# Patient Record
Sex: MALE | Race: ASIAN | NOT HISPANIC OR LATINO | Employment: FULL TIME | URBAN - METROPOLITAN AREA
[De-identification: names, ages, dates, MRNs, and addresses within clinical notes are randomized per-mention and may not be internally consistent; named-entity substitution may affect disease eponyms.]

---

## 2019-09-06 ENCOUNTER — OFFICE VISIT (OUTPATIENT)
Dept: URGENT CARE | Facility: URGENT CARE | Age: 29
End: 2019-09-06
Payer: COMMERCIAL

## 2019-09-06 ENCOUNTER — ANCILLARY PROCEDURE (OUTPATIENT)
Dept: GENERAL RADIOLOGY | Facility: CLINIC | Age: 29
End: 2019-09-06
Attending: PHYSICIAN ASSISTANT
Payer: COMMERCIAL

## 2019-09-06 VITALS
HEART RATE: 64 BPM | SYSTOLIC BLOOD PRESSURE: 134 MMHG | TEMPERATURE: 98 F | DIASTOLIC BLOOD PRESSURE: 81 MMHG | BODY MASS INDEX: 23.01 KG/M2 | WEIGHT: 143.2 LBS | RESPIRATION RATE: 18 BRPM | HEIGHT: 66 IN | OXYGEN SATURATION: 97 %

## 2019-09-06 DIAGNOSIS — M25.531 RIGHT WRIST PAIN: Primary | ICD-10-CM

## 2019-09-06 DIAGNOSIS — S69.91XA WRIST INJURY, RIGHT, INITIAL ENCOUNTER: ICD-10-CM

## 2019-09-06 DIAGNOSIS — M25.531 RIGHT WRIST PAIN: ICD-10-CM

## 2019-09-06 PROCEDURE — 99203 OFFICE O/P NEW LOW 30 MIN: CPT | Performed by: PHYSICIAN ASSISTANT

## 2019-09-06 PROCEDURE — 73110 X-RAY EXAM OF WRIST: CPT | Mod: RT

## 2019-09-06 RX ORDER — IBUPROFEN 800 MG/1
800 TABLET, FILM COATED ORAL EVERY 8 HOURS PRN
Qty: 30 TABLET | Refills: 0 | Status: SHIPPED | OUTPATIENT
Start: 2019-09-06 | End: 2019-09-16

## 2019-09-06 SDOH — HEALTH STABILITY: MENTAL HEALTH: HOW OFTEN DO YOU HAVE A DRINK CONTAINING ALCOHOL?: NEVER

## 2019-09-06 ASSESSMENT — MIFFLIN-ST. JEOR: SCORE: 1565.17

## 2019-09-06 NOTE — PROGRESS NOTES
"S: 28-year-old male with right wrist pain for 2 months after he was drinking, became angry and punched a wall.  Has persistent pain on the ulnar dorsal wrist.  No numbness or tingling.  No fever.  No rash.  He does lots of lifting at work. Ice no help.  No prior wrist injury.  No Known Allergies    Past medical history-negative diabetes  Family medical history-negative diabetes.      No current outpatient medications on file prior to visit.  No current facility-administered medications on file prior to visit.     Social History     Tobacco Use     Smoking status: Never Smoker     Smokeless tobacco: Never Used   Substance Use Topics     Alcohol use: Not Currently     Frequency: Never       ROS:  CONSTITUTIONAL: Negative for fatigue or fever.  EYES: Negative for eye problems.  ENT: Negative   RESP: Negative  CV: Negative for chest pains.  GI: Negative for vomiting.  MUSCULOSKELETAL: As above   NEUROLOGIC: Negative for headaches.  SKIN: Negative for rash.  PSYCH: Normal mentation for age.    OBJECTIVE:  /81 (BP Location: Left arm, Patient Position: Sitting, Cuff Size: Adult Regular)   Pulse 64   Temp 98  F (36.7  C) (Oral)   Resp 18   Ht 1.665 m (5' 5.55\")   Wt 65 kg (143 lb 3.2 oz)   SpO2 97%   BMI 23.43 kg/m    GENERAL APPEARANCE: Healthy, alert and no distress.  EYES:Conjunctiva/sclera clear.  THROAT: No erythema w/o tonsillar enlargement . No exudates.  NECK: Supple, nontender, no lymphadenopathy.  RESP: Breathing comfortably   CV: Regular rate and rhythm, normal S1 S2, no murmur noted.  NEURO: Awake, alert    SKIN: No rashes  Right wrist is with increased bony prominence ulnar styloid compared to the other wrist joint.  He is tender over ulnar styloid process with ulnar deviation of the wrist.  Circulation and sensation intact.  Study Result     Examination:  XR WRIST RT G/E 3 VW     Date:  9/6/2019 3:11 PM      Clinical Information: dorsal ulnar wrist pain after punching a wall 2  months ago; Right " wrist pain; Wrist injury, right, initial encounter      Comparison: None.     Findings: No fracture or subluxation. Normal joint spacing and  alignment. Normal bone mineralization. No soft tissue abnormality is  evident.                                                                      Impression: Unremarkable right wrist examination.         ASSESSMENT:     ICD-10-CM    1. Right wrist pain M25.531 XR Wrist Right G/E 3 Views     order for DME     ORTHO  REFERRAL     ibuprofen (ADVIL/MOTRIN) 800 MG tablet   2. Wrist injury, right, initial encounter S69.91XA XR Wrist Right G/E 3 Views     order for DME     ORTHO  REFERRAL     ibuprofen (ADVIL/MOTRIN) 800 MG tablet           PLAN:Ice. Ibu. Wrist splint x 1 week. See Ortho  I have discussed clinical findings with patient.  Side effects of medications discussed.  Symptomatic care is discussed.  I have discussed the possibility of  worsening symptoms and to RTC or ER if they occur.  All questions are answered and patient is in agreement with plan.   Patient care instructions are given to at the end of visit.   Lots of rest and fluids.    Marcy Shaffer PA-C

## 2019-09-19 ENCOUNTER — OFFICE VISIT (OUTPATIENT)
Dept: ORTHOPEDICS | Facility: CLINIC | Age: 29
End: 2019-09-19
Payer: COMMERCIAL

## 2019-09-19 VITALS
WEIGHT: 143 LBS | HEIGHT: 66 IN | DIASTOLIC BLOOD PRESSURE: 77 MMHG | SYSTOLIC BLOOD PRESSURE: 118 MMHG | BODY MASS INDEX: 22.98 KG/M2

## 2019-09-19 DIAGNOSIS — S69.91XA INJURY OF RIGHT WRIST, INITIAL ENCOUNTER: Primary | ICD-10-CM

## 2019-09-19 PROCEDURE — 99203 OFFICE O/P NEW LOW 30 MIN: CPT | Performed by: PEDIATRICS

## 2019-09-19 ASSESSMENT — MIFFLIN-ST. JEOR: SCORE: 1564.25

## 2019-09-19 NOTE — LETTER
"    9/19/2019         RE: Edy Calderon  [address] [facility] [address]        Dear Colleague,    Thank you for referring your patient, Edy Calderon, to the [facility]. Please see a copy of my visit note below.    Sports Medicine Clinic Visit    PCP: No Ref-Primary, Physician    Edy Calderon is a 28 year old male who is seen  in consultation at the request of  Marcy Shaffer PA-C presenting with right wrist pain    Injury: He reports right wrist pain after punching a walling in July 2019. He reports pain in the ulnar wrist. He reports his pain increases with extension and lifting. He was provided a wrist brace from his PCP which has helped over the past 1.5 weeks. He is right hand dominate.    Location of Pain: right wrist  Duration of Pain: 2 month(s)  Rating of Pain at worst: 6/10  Rating of Pain Currently: 3/10  Symptoms are better with: bracing  Symptoms are worse with: extension and lifting  Additional Features:   Positive: swelling and weakness   Negative: bruising, popping, grinding, catching, locking, instability, paresthesias and numbness  Other evaluation and/or treatments so far consists of: Nothing  Prior History of related problems: nothing    Social History: works at [employer]     Review of Systems  Skin: no bruising, no swelling  Musculoskeletal: as above  Neurologic: no numbness, paresthesias  Remainder of review of systems is negative including constitutional, CV, pulmonary, GI, except as noted in HPI or medical history.    Patient's current problem list, past medical and surgical history, and family history were reviewed.    There is no problem list on file for this patient.    No past medical history on file.  No past surgical history on file.  No family history on file.      Objective  /77   Ht 1.665 m (5' 5.55\")   Wt 64.9 kg (143 lb)   BMI 23.40 kg/m       GENERAL APPEARANCE: healthy, alert and no distress   GAIT: NORMAL  SKIN: no " suspicious lesions or rashes  HEENT: Sclera clear, anicteric  CV: good peripheral pulses  RESP: Breathing not labored  NEURO: Normal strength and tone, mentation intact and speech normal  PSYCH:  mentation appears normal and affect normal/bright    Bilateral Wrist and Hand exam  Inspection:       No swelling, bruising or deformity right, slightly more prominent ulnar styloid    Tender:       TFCC right and into extensor tendons    Non Tender:       Remainder of the Wrist and Hand bilateral    ROM:       Full and symmetric active and passive range of motion of the forearm, wrist and digits bilateral    Strength:       5/5 strength in the muscles of the hand, wrist and forearm bilateral    Special Tests:        positive (+) TFCC compression test right    Neurovascular:       2+ radial pulses bilaterally with brisk capillary refill and      normal sensation to light touch in the radial, median and ulnar nerve distributions      Radiology  I visualized and reviewed these images with the patient  Xr Wrist Right G/e 3 Views  Result Date: 9/6/2019  Examination:  XR WRIST RT G/E 3 VW Date:  9/6/2019 3:11 PM Clinical Information: dorsal ulnar wrist pain after punching a wall 2 months ago; Right wrist pain; Wrist injury, right, initial encounter Comparison: None. Findings: No fracture or subluxation. Normal joint spacing and alignment. Normal bone mineralization. No soft tissue abnormality is evident.   Impression: Unremarkable right wrist examination. TENZIN ROME MD    Assessment:  1. Injury of right wrist, initial encounter      We discussed these other possible diagnosis: TFCC injury  Exam suspicious for TFCC injury. We discussed the following treatment options: symptom treatment, activity modification/rest, imaging, rehab and referral. Following discussion, plan: will continue bracing and consider imaging pending clinical course.    Plan:  - Today's Plan of Care:  Continue Bracing    -We also discussed other future  treatment options:  MR arthrogram of the right wrist, Occupational Therapy    Follow Up: 3-4 weeks    Concerning signs and symptoms were reviewed.  The patient expressed understanding of this management plan and all questions were answered at this time.    Thanks for the opportunity to participate in the care of this patient, I will keep you updated on their progress.    CC: Marcy Cuevas MD [facility]  Primary Care Sports Medicine  [facility]    Again, thank you for allowing me to participate in the care of your patient.        Sincerely,        Anita Cuevas MD

## 2019-09-19 NOTE — PATIENT INSTRUCTIONS
We discussed these other possible diagnosis: CC injury    Plan:  - Today's Plan of Care:  Continue Bracing    -We also discussed other future treatment options:  MR arthrogram of the right wrist, Occupational Therapy    Follow Up: 3-4 weeks    If you have any further questions for your physician or physician s care team you can call [phone] and use option 3 to leave a voice message. Calls received during business hours will be returned same day.

## 2019-09-19 NOTE — PROGRESS NOTES
"Sports Medicine Clinic Visit    PCP: No Ref-Primary, Physician    Edy Calderon is a 28 year old male who is seen  in consultation at the request of  Marcy Shaffer PA-C presenting with right wrist pain    Injury: He reports right wrist pain after punching a walling in July 2019. He reports pain in the ulnar wrist. He reports his pain increases with extension and lifting. He was provided a wrist brace from his PCP which has helped over the past 1.5 weeks. He is right hand dominate.    Location of Pain: right wrist  Duration of Pain: 2 month(s)  Rating of Pain at worst: 6/10  Rating of Pain Currently: 3/10  Symptoms are better with: bracing  Symptoms are worse with: extension and lifting  Additional Features:   Positive: swelling and weakness   Negative: bruising, popping, grinding, catching, locking, instability, paresthesias and numbness  Other evaluation and/or treatments so far consists of: Nothing  Prior History of related problems: nothing    Social History: works at [employer]     Review of Systems  Skin: no bruising, no swelling  Musculoskeletal: as above  Neurologic: no numbness, paresthesias  Remainder of review of systems is negative including constitutional, CV, pulmonary, GI, except as noted in HPI or medical history.    Patient's current problem list, past medical and surgical history, and family history were reviewed.    There is no problem list on file for this patient.    No past medical history on file.  No past surgical history on file.  No family history on file.      Objective  /77   Ht 1.665 m (5' 5.55\")   Wt 64.9 kg (143 lb)   BMI 23.40 kg/m      GENERAL APPEARANCE: healthy, alert and no distress   GAIT: NORMAL  SKIN: no suspicious lesions or rashes  HEENT: Sclera clear, anicteric  CV: good peripheral pulses  RESP: Breathing not labored  NEURO: Normal strength and tone, mentation intact and speech normal  PSYCH:  mentation appears normal and affect normal/bright    Bilateral " Wrist and Hand exam  Inspection:       No swelling, bruising or deformity right, slightly more prominent ulnar styloid    Tender:       TFCC right and into extensor tendons    Non Tender:       Remainder of the Wrist and Hand bilateral    ROM:       Full and symmetric active and passive range of motion of the forearm, wrist and digits bilateral    Strength:       5/5 strength in the muscles of the hand, wrist and forearm bilateral    Special Tests:        positive (+) TFCC compression test right    Neurovascular:       2+ radial pulses bilaterally with brisk capillary refill and      normal sensation to light touch in the radial, median and ulnar nerve distributions      Radiology  I visualized and reviewed these images with the patient  Xr Wrist Right G/e 3 Views  Result Date: 9/6/2019  Examination:  XR WRIST RT G/E 3 VW Date:  9/6/2019 3:11 PM Clinical Information: dorsal ulnar wrist pain after punching a wall 2 months ago; Right wrist pain; Wrist injury, right, initial encounter Comparison: None. Findings: No fracture or subluxation. Normal joint spacing and alignment. Normal bone mineralization. No soft tissue abnormality is evident.   Impression: Unremarkable right wrist examination. TENZIN ROME MD    Assessment:  1. Injury of right wrist, initial encounter      We discussed these other possible diagnosis: TFCC injury  Exam suspicious for TFCC injury. We discussed the following treatment options: symptom treatment, activity modification/rest, imaging, rehab and referral. Following discussion, plan: will continue bracing and consider imaging pending clinical course.    Plan:  - Today's Plan of Care:  Continue Bracing    -We also discussed other future treatment options:  MR arthrogram of the right wrist, Occupational Therapy    Follow Up: 3-4 weeks    Concerning signs and symptoms were reviewed.  The patient expressed understanding of this management plan and all questions were answered at this  time.    Thanks for the opportunity to participate in the care of this patient, I will keep you updated on their progress.    CC: Marcy Cuevas MD CAQ  Primary Care Sports Medicine  [facility]

## 2019-10-31 ENCOUNTER — OFFICE VISIT (OUTPATIENT)
Dept: FAMILY MEDICINE | Facility: CLINIC | Age: 29
End: 2019-10-31
Payer: COMMERCIAL

## 2019-10-31 VITALS
HEIGHT: 66 IN | SYSTOLIC BLOOD PRESSURE: 112 MMHG | DIASTOLIC BLOOD PRESSURE: 73 MMHG | HEART RATE: 55 BPM | RESPIRATION RATE: 16 BRPM | TEMPERATURE: 98.1 F | WEIGHT: 145.5 LBS | OXYGEN SATURATION: 96 % | BODY MASS INDEX: 23.38 KG/M2

## 2019-10-31 DIAGNOSIS — M71.9 DISORDER OF BURSAE AND TENDONS IN SHOULDER REGION: Primary | ICD-10-CM

## 2019-10-31 DIAGNOSIS — M67.919 DISORDER OF BURSAE AND TENDONS IN SHOULDER REGION: Primary | ICD-10-CM

## 2019-10-31 PROCEDURE — 99213 OFFICE O/P EST LOW 20 MIN: CPT | Performed by: PHYSICIAN ASSISTANT

## 2019-10-31 RX ORDER — METHOCARBAMOL 750 MG/1
750 TABLET, FILM COATED ORAL 3 TIMES DAILY PRN
Qty: 45 TABLET | Refills: 1 | Status: SHIPPED | OUTPATIENT
Start: 2019-10-31

## 2019-10-31 RX ORDER — DICLOFENAC SODIUM 75 MG/1
75 TABLET, DELAYED RELEASE ORAL 2 TIMES DAILY
Qty: 30 TABLET | Refills: 1 | Status: SHIPPED | OUTPATIENT
Start: 2019-10-31

## 2019-10-31 ASSESSMENT — PAIN SCALES - GENERAL: PAINLEVEL: SEVERE PAIN (7)

## 2019-10-31 ASSESSMENT — MIFFLIN-ST. JEOR: SCORE: 1574.79

## 2019-10-31 NOTE — PROGRESS NOTES
"Subjective     Edy Calderon is a 28 year old male who presents to clinic today for the following health issues:    HPI   Joint Pain    Onset: 1.5 months    Description:   Location: left shoulder  Character: Sharp    Intensity: severe    Progression of Symptoms: same    Accompanying Signs & Symptoms:  Other symptoms: none    History:   Previous similar pain: no       Precipitating factors:   Trauma or overuse: no . Patient denies any trauma, any activity that led to pain. He woke up one day with shoulder pain     Alleviating factors:  Improved by: nothing    Therapies Tried and outcome: Patient states he hasn't tried anything          There is no problem list on file for this patient.    History reviewed. No pertinent surgical history.    Social History     Tobacco Use     Smoking status: Never Smoker     Smokeless tobacco: Never Used   Substance Use Topics     Alcohol use: Not Currently     Frequency: Never     History reviewed. No pertinent family history.      Current Outpatient Medications   Medication Sig Dispense Refill     diclofenac (VOLTAREN) 75 MG EC tablet Take 1 tablet (75 mg) by mouth 2 times daily 30 tablet 1     methocarbamol (ROBAXIN) 750 MG tablet Take 1 tablet (750 mg) by mouth 3 times daily as needed for muscle spasms 45 tablet 1     No Known Allergies    Reviewed and updated as needed this visit by Provider  Tobacco  Allergies  Meds  Problems  Med Hx  Surg Hx  Fam Hx         Review of Systems   ROS COMP: Constitutional, HEENT, cardiovascular, pulmonary, gi and gu systems are negative, except as otherwise noted.      Objective    /73 (BP Location: Right arm, Patient Position: Sitting, Cuff Size: Adult Large)   Pulse 55   Temp 98.1  F (36.7  C) (Oral)   Resp 16   Ht 1.664 m (5' 5.5\")   Wt 66 kg (145 lb 8 oz)   SpO2 96%   BMI 23.84 kg/m    Body mass index is 23.84 kg/m .  Physical Exam   GENERAL: healthy, alert and no distress  MS: LUE exam shows normal strength and muscle mass, no " deformities, no erythema, induration, or nodules, no evidence of joint effusion, ROM of all joints is normal, no evidence of joint instability and tenderness over the lateral shoulder . Painful lateral abduction    Diagnostic Test Results:  Labs reviewed in Epic  none         Assessment & Plan       ICD-10-CM    1. Disorder of bursae and tendons in shoulder region M71.9 diclofenac (VOLTAREN) 75 MG EC tablet    M67.919 methocarbamol (ROBAXIN) 750 MG tablet     Diclofenac 75 mg twice a day for 7 with food, then use on as needed basis  Robaxin 750 mg three times a day as needed for muscle tightness   Discussed that may need steroid injection in the future.   Follow up in 2 weeks if not better       Return in about 2 weeks (around 11/14/2019), or if symptoms worsen or fail to improve.    Cara Wing PA-C  [facility]

## 2019-10-31 NOTE — PATIENT INSTRUCTIONS
Diclofenac 75 mg twice a day for 7 with food, then use on as needed basis  Robaxin 750 mg three times a day as needed for muscle tightness     Follow up in 2 weeks if not better   Patient Education     Understanding Rotator Cuff Tendonitis    Tendons are tough tissues that connect muscles to bone. A group of 4 muscles and their tendons form a  cuff  around the head of the upper arm bone. This is called the rotator cuff. It connects the upper arm to the shoulder blade. It gives the shoulder joint stability and strength.  If tendons are injured or strained, they may become irritated and swollen (inflamed). This is called tendonitis. Rotator cuff tendonitis may cause shoulder pain and loss of function.  What causes rotator cuff tendonitis?  Tendonitis results when the rotator cuff tendons are injured or overworked. The most common cause of injury is repetitive overhead activities. These can be work-related activities such as reaching, pushing, or lifting. Or they can be sports-related activities such as throwing, swimming, or lifting weights.  Symptoms of rotator cuff tendonitis  Pain on the side of the upper arm is the most common symptom. Pain may get worse with overhead movements or when you raise the arm above shoulder level. It may also hurt to lie on the shoulder at night.  Treatment for rotator cuff tendonitis  Treatment may include the following:    Active rest. This lets the rotator cuff heal. Active rest means using your arm and shoulder, but avoiding activities that cause pain, such as reaching overhead or sleeping on the shoulder.    Cold packs. Putting ice packs on the shoulder helps reduce swelling and relieve pain.    Pain medicines. Prescription or over-the-counter pain medicines can help relieve pain and swelling.    Arm and shoulder exercises. These help keep the shoulder joint mobile as it heals. They also help improve the strength of muscles around the joint.  Possible complications  It might be  tempting to stop using your shoulder completely to avoid pain. But doing so may lead to a condition called  frozen shoulder.  To help prevent this, following instructions you are given for active rest and for doing exercises to help your shoulder heal.  When to call your healthcare provider  Call your healthcare provider right away if you have any of these:    Fever of 100.4 F (38 C) or higher, or as directed    Symptoms that don t get better, or get worse    New symptoms   Date Last Reviewed: 3/10/2016    3934-8122 [employer]. [address] [place] [address]. All rights reserved. This information is not intended as a substitute for professional medical care. Always follow your healthcare professional's instructions.           Patient Education     Shoulder External Rotation, Isometric (Strength)    1. Bend your right arm in front of your body, palm up. Hold your right wrist with your left hand.  2. Try to push your right arm outward, while pulling back with your left arm. Try not to let either arm move. Push and pull both arms firmly in opposite directions.  3. Hold for 5 seconds. Then relax.  4. Repeat 5 times.  5. Repeat this exercise 3 times a day, or as instructed.  Date Last Reviewed: 3/10/2016    4115-8496 [employer]. [address] [place] [address]. All rights reserved. This information is not intended as a substitute for professional medical care. Always follow your healthcare professional's instructions.           Patient Education     Shoulder Abduction (Flexibility)    1. Stand up straight. Or lie on your back on the floor with legs straight. Hold a broomstick horizontally. Cup the top of the broomstick with your right hand. Hold the broomstick just above the broom with your left hand, palm facing down.  2. Push the broomstick to the right with your left hand, raising your right arm up. Raise it only as high as feels comfortable.  3. Hold for a few  seconds. Return to the starting position.  4. Repeat 3 to 5 times, or as instructed. Build up to holding each stretch for 10 to 30 seconds.     Tip: If you don t have a broom, you can also do this exercise with a cane, mop, or yardstick.      Date Last Reviewed: 3/10/2016    4851-2440 [employer]. [address] [place] [address]. All rights reserved. This information is not intended as a substitute for professional medical care. Always follow your healthcare professional's instructions.           Patient Education     Shoulder Flexion (Flexibility)    1. Sit in a chair sideways next to a table. Lay your right arm on the table, pointed forward.  2. Slowly lean forward.  Slide your arm forward on the table. Feel the stretch in your right shoulder.  3. Hold for 5 seconds. Slowly sit back up.  4. Repeat 5 times.  5. Repeat this exercise 3 times a day, or as instructed.  Date Last Reviewed: 3/10/2016    3461-7765 [employer]. [address] [place] [address]. All rights reserved. This information is not intended as a substitute for professional medical care. Always follow your healthcare professional's instructions.

## 2023-03-20 ENCOUNTER — ANCILLARY PROCEDURE (OUTPATIENT)
Dept: GENERAL RADIOLOGY | Facility: CLINIC | Age: 33
End: 2023-03-20
Payer: COMMERCIAL

## 2023-03-20 ENCOUNTER — OFFICE VISIT (OUTPATIENT)
Dept: URGENT CARE | Facility: URGENT CARE | Age: 33
End: 2023-03-20
Payer: COMMERCIAL

## 2023-03-20 VITALS
TEMPERATURE: 97.4 F | HEART RATE: 65 BPM | OXYGEN SATURATION: 97 % | BODY MASS INDEX: 24.84 KG/M2 | DIASTOLIC BLOOD PRESSURE: 89 MMHG | WEIGHT: 151.6 LBS | SYSTOLIC BLOOD PRESSURE: 144 MMHG

## 2023-03-20 DIAGNOSIS — M79.644 PAIN OF FINGER OF RIGHT HAND: ICD-10-CM

## 2023-03-20 DIAGNOSIS — S62.639A CLOSED AVULSION FRACTURE OF DISTAL PHALANX OF FINGER, INITIAL ENCOUNTER: Primary | ICD-10-CM

## 2023-03-20 PROCEDURE — 73140 X-RAY EXAM OF FINGER(S): CPT | Mod: TC | Performed by: RADIOLOGY

## 2023-03-20 PROCEDURE — 99203 OFFICE O/P NEW LOW 30 MIN: CPT

## 2023-03-20 ASSESSMENT — PAIN SCALES - GENERAL: PAINLEVEL: SEVERE PAIN (7)

## 2023-03-20 NOTE — PROGRESS NOTES
ASSESSMENT:  (I47.159D) Closed avulsion fracture of distal phalanx of finger, initial encounter  (primary encounter diagnosis)  Plan: Orthopedic  Referral, FOAM FINGER         SPLINT S    (M54.136) Pain of finger of right hand  Plan: XR Finger Right G/E 2 Views    PLAN:  Informed the patient that the right index finger x-ray shows an avulsion fracture per the radiologist report.  We discussed the need for him to wear the splint until follow-up with orthopedics.  Informed him he can use Tylenol and or ibuprofen as needed for pain with maximum dose Tylenol being 4000 mg in a 24-hour period of time and to take ibuprofen with food to avoid upset stomach.  Work note provided.  Discussed the need to return to clinic with any new or worsening symptoms.  Patient acknowledged his understanding of the above plan.    The use of Dragon/[employer] services may have been used to construct the content in this note; any grammatical or spelling errors are non-intentional. Please contact the author of this note directly if you are in need of any clarification.      YOKO Linares CNP      SUBJECTIVE:  Edy Calderon is a 32 year old male who sustained a right index finger injury 2 weeks ago.   Mechanism of injury: playing basketball and the ball hit the finger.   Immediate symptoms: immediate pain, immediate swelling.   Symptoms have been unchanged since that time.   Patient has not used any treatment.   Prior history of related problems: no prior problems with this area in the past.    ROS:  Negative except noted above.       OBJECTIVE:  Blood pressure (!) 144/89, pulse 65, temperature 97.4  F (36.3  C), temperature source Tympanic, weight 68.8 kg (151 lb 9.6 oz), SpO2 97 %.  Appearance: in no apparent distress.  Inspection: Edema noted at the distal phalanx of the right index finger  tender over DIP joint  Limited ROM DIP joint due to pain and swelling    X-ray: mildly displaced avulsion fracture at the  dorsal base of the second distal phalanx with intra-articular extension per radiologist report

## 2023-03-20 NOTE — PATIENT INSTRUCTIONS
Finger x-ray shows an avulsion fracture per the radiologist report.  Wear the splint until follow up with orthopedics.  Can use Tylenol and/or ibuprofen as needed for pain.  Maximum dose of Tylenol is 4000mg in a 24 hour period of time.  Take ibuprofen with food to avoid stomach upset.

## 2023-03-20 NOTE — LETTER
March 20, 2023      Edy Calderon  [address] [place] [address]        To Whom It May Concern:    Edy Calderon  was seen on March 20, 2023.  Please excuse him from any job duties that would require him to use his right index finger until follow up with orthopedics due to injury.        Sincerely,        YOKO Linares CNP

## 2023-03-23 ENCOUNTER — TELEPHONE (OUTPATIENT)
Dept: ORTHOPEDICS | Facility: CLINIC | Age: 33
End: 2023-03-23
Payer: COMMERCIAL

## 2023-03-23 NOTE — TELEPHONE ENCOUNTER
Orthopedic/Sports Medicine Fracture Triage    Incoming call/or message from call center member.    Fracture type: Finger    The patient is in a  nothing.    Date of injury 3/6/23.    Triaged by: Dr. Penny.    Determined to be managed Non operatively.    Needs to be seen ASAP. Needs to get splinted.     Additional Comments/information: Staff message sent to ortho coordinators and sports med staff to schedule patient with sports medicine.    Ginny Lowe, ATC

## 2023-03-23 NOTE — TELEPHONE ENCOUNTER
[facility] Call Center    Phone Message    May a detailed message be left on voicemail: yes     Reason for Call: Other: Please see Urgent ref for Closed avulsion fracture of distal phalanx of finger, . Surg or non? Appt options pt lives in [place] however nothing with surgeon at that location until next week      Action Taken: Other: ortho     Travel Screening: Not Applicable

## 2023-03-24 NOTE — TELEPHONE ENCOUNTER
DIAGNOSIS: Splint needed// per staff message   APPOINTMENT DATE: 3.25.23 - finger R   NOTES STATUS DETAILS   OFFICE NOTE from referring provider Internal 3.20.23 Buster Domingo, APRN CNP     OFFICE NOTE from other specialist Internal Internal:  9.19.19 Anita Cuevas MD    9.6.19 Marcy Shaffer PA-C     HP:  9.15.15 Erick Bernal, DO       MEDICATION LIST Internal    XRAYS (IMAGES & REPORTS) Internal 3.20.23 R finger  9.6.19 R wrist

## 2023-03-25 ENCOUNTER — PRE VISIT (OUTPATIENT)
Dept: ORTHOPEDICS | Facility: CLINIC | Age: 33
End: 2023-03-25

## 2023-03-25 ENCOUNTER — OFFICE VISIT (OUTPATIENT)
Dept: ORTHOPEDICS | Facility: CLINIC | Age: 33
End: 2023-03-25
Payer: COMMERCIAL

## 2023-03-25 VITALS — BODY MASS INDEX: 24.11 KG/M2 | HEIGHT: 66 IN | WEIGHT: 150 LBS

## 2023-03-25 DIAGNOSIS — M20.011 MALLET FINGER OF RIGHT FINGER(S): ICD-10-CM

## 2023-03-25 DIAGNOSIS — S62.639A CLOSED AVULSION FRACTURE OF DISTAL PHALANX OF FINGER, INITIAL ENCOUNTER: Primary | ICD-10-CM

## 2023-03-25 PROCEDURE — 99203 OFFICE O/P NEW LOW 30 MIN: CPT | Performed by: FAMILY MEDICINE

## 2023-03-25 NOTE — LETTER
Date:March 27, 2023      Patient was self referred, no letter generated. Do not send.        [facility] Health Information

## 2023-03-25 NOTE — LETTER
"  3/25/2023      RE: Edy Calderon  [address]  [facility] [address]     Dear Colleague,    Thank you for referring your patient, Edy Calderon, to the [facility] [place]. Please see a copy of my visit note below.    Sports Medicine Clinic Visit    PCP: No Ref-Primary, Physician    Edy Calderon is a 32 year old male who is seen as an UC follow up presenting with right index finger pain.    Injury: He was playing basketball and the ball hit his finger.    Location of Pain: right index finger  Duration of Pain: 2 weeks  Rating of Pain: 7/10  Pain is better with: Splint, ice  Pain is worse with: Movement  Additional Features: Swelling which has improved  Treatment so far consists of: Ice, splint  Prior History of related problems: None    Ht 1.676 m (5' 6\")   Wt 68 kg (150 lb)   BMI 24.21 kg/m       Second distal phalanx avulsion fracture.  Patient was playing basketball two weeks ago and was struck at the distal digit.  Seen in urgent care 3/20/2023, 5 days ago,with a mallet finger and avulsion fracture and given a finger splint.  Skin intact.    Patient has worked at an auto parts store, [employer], stocking.        EXAM: XR FINGER RIGHT G/E 2 VIEWS  LOCATION: [facility] [place]  DATE/TIME: 3/20/2023 5:58 PM     INDICATION:  Pain of finger of right hand  COMPARISON: None.                                                                      IMPRESSION: Acute mildly displaced avulsion fracture at the dorsal base of the second distal phalanx with intra-articular extension. Surrounding soft tissue swelling. There is normal joint spacing and alignment.        PMH:  Otitis    Active problem list:  There is no problem list on file for this patient.      FH:  No family history on file.    SH:  Social History     Socioeconomic History     Marital status: Single     Spouse name: Not on file     Number of children: Not on file     Years of education: Not on file     Highest " education level: Not on file   Occupational History     Not on file   Tobacco Use     Smoking status: Never     Smokeless tobacco: Never   Substance and Sexual Activity     Alcohol use: Not Currently     Drug use: Never     Sexual activity: Not on file   Other Topics Concern     Not on file   Social History Narrative     Not on file     Social Determinants of Health     Financial Resource Strain: Not on file   Food Insecurity: Not on file   Transportation Needs: Not on file   Physical Activity: Not on file   Stress: Not on file   Social Connections: Not on file   Intimate Partner Violence: Not on file   Housing Stability: Not on file       MEDS:  See EMR, reviewed  ALL:  See EMR, reviewed    REVIEW OF SYSTEMS:  CONSTITUTIONAL:NEGATIVE for fever, chills, change in weight  INTEGUMENTARY/SKIN: NEGATIVE for worrisome rashes, moles or lesions  EYES: NEGATIVE for vision changes or irritation  ENT/MOUTH: NEGATIVE for ear, mouth and throat problems  RESP:NEGATIVE for significant cough or SOB  BREAST: NEGATIVE for masses, tenderness or discharge  CV: NEGATIVE for chest pain, palpitations or peripheral edema  GI: NEGATIVE for nausea, abdominal pain, heartburn, or change in bowel habits  :NEGATIVE for frequency, dysuria, or hematuria  :NEGATIVE for frequency, dysuria, or hematuria  NEURO: NEGATIVE for weakness, dizziness or paresthesias  ENDOCRINE: NEGATIVE for temperature intolerance, skin/hair changes  HEME/ALLERGY/IMMUNE: NEGATIVE for bleeding problems  PSYCHIATRIC: NEGATIVE for changes in mood or affect      Objective: His injury was 2 weeks ago; he has been splinted with an aluminum splint for 5 days.  Skin is intact over the distal digit of the index finger.  He cannot extend the distal digit against resistance with adequate strength.  His distal digit is kept in a neutral position during the exam, taking care to avoid any finger flexion.    I personally reviewed with the patient the x-ray that shows the avulsion  fracture    Assessment mallet finger deformity with avulsion fracture of index finger x2 weeks, splinted x5 days    Plan: I told the patient that I would have the x-rays reviewed by hand surgery when they are here in clinic early this week and call him at [phone], to make sure that he does not need to have the fracture pinned.  If he proceeds with a nonsurgical approach of the fracture, he understands that when this heals he may still have a slight flexion deformity.  He will see hand therapy tomorrow for formation of 2 custom mallet finger splints that he will wear for the next 5 weeks.  He understands that when he changes the splint he will have to keep his finger in extension by either pinching it with his thumb or keeping it flat on the table.  Follow-up with me in 5 weeks.                                    Again, thank you for allowing me to participate in the care of your patient.      Sincerely,    Jayjay Davalos MD

## 2023-03-25 NOTE — PROGRESS NOTES
"Sports Medicine Clinic Visit    PCP: No Ref-Primary, Physician    Edy Calderon is a 32 year old male who is seen as an UC follow up presenting with right index finger pain.    Injury: He was playing basketball and the ball hit his finger.    Location of Pain: right index finger  Duration of Pain: 2 weeks  Rating of Pain: 7/10  Pain is better with: Splint, ice  Pain is worse with: Movement  Additional Features: Swelling which has improved  Treatment so far consists of: Ice, splint  Prior History of related problems: None    Ht 1.676 m (5' 6\")   Wt 68 kg (150 lb)   BMI 24.21 kg/m       Second distal phalanx avulsion fracture.  Patient was playing basketball two weeks ago and was struck at the distal digit.  Seen in urgent care 3/20/2023, 5 days ago,with a mallet finger and avulsion fracture and given a finger splint.  Skin intact.    Patient has worked at an auto parts store, [employer], stocking.        EXAM: XR FINGER RIGHT G/E 2 VIEWS  LOCATION: [facility] [place]  DATE/TIME: 3/20/2023 5:58 PM     INDICATION:  Pain of finger of right hand  COMPARISON: None.                                                                      IMPRESSION: Acute mildly displaced avulsion fracture at the dorsal base of the second distal phalanx with intra-articular extension. Surrounding soft tissue swelling. There is normal joint spacing and alignment.        PMH:  Otitis    Active problem list:  There is no problem list on file for this patient.      FH:  No family history on file.    SH:  Social History     Socioeconomic History     Marital status: Single     Spouse name: Not on file     Number of children: Not on file     Years of education: Not on file     Highest education level: Not on file   Occupational History     Not on file   Tobacco Use     Smoking status: Never     Smokeless tobacco: Never   Substance and Sexual Activity     Alcohol use: Not Currently     Drug use: Never     Sexual activity: Not on file "   Other Topics Concern     Not on file   Social History Narrative     Not on file     Social Determinants of Health     Financial Resource Strain: Not on file   Food Insecurity: Not on file   Transportation Needs: Not on file   Physical Activity: Not on file   Stress: Not on file   Social Connections: Not on file   Intimate Partner Violence: Not on file   Housing Stability: Not on file       MEDS:  See EMR, reviewed  ALL:  See EMR, reviewed    REVIEW OF SYSTEMS:  CONSTITUTIONAL:NEGATIVE for fever, chills, change in weight  INTEGUMENTARY/SKIN: NEGATIVE for worrisome rashes, moles or lesions  EYES: NEGATIVE for vision changes or irritation  ENT/MOUTH: NEGATIVE for ear, mouth and throat problems  RESP:NEGATIVE for significant cough or SOB  BREAST: NEGATIVE for masses, tenderness or discharge  CV: NEGATIVE for chest pain, palpitations or peripheral edema  GI: NEGATIVE for nausea, abdominal pain, heartburn, or change in bowel habits  :NEGATIVE for frequency, dysuria, or hematuria  :NEGATIVE for frequency, dysuria, or hematuria  NEURO: NEGATIVE for weakness, dizziness or paresthesias  ENDOCRINE: NEGATIVE for temperature intolerance, skin/hair changes  HEME/ALLERGY/IMMUNE: NEGATIVE for bleeding problems  PSYCHIATRIC: NEGATIVE for changes in mood or affect      Objective: His injury was 2 weeks ago; he has been splinted with an aluminum splint for 5 days.  Skin is intact over the distal digit of the index finger.  He cannot extend the distal digit against resistance with adequate strength.  His distal digit is kept in a neutral position during the exam, taking care to avoid any finger flexion.    I personally reviewed with the patient the x-ray that shows the avulsion fracture    Assessment mallet finger deformity with avulsion fracture of index finger x2 weeks, splinted x5 days    Plan: I told the patient that I would have the x-rays reviewed by hand surgery when they are here in clinic early this week and call him at  [phone], to make sure that he does not need to have the fracture pinned.  If he proceeds with a nonsurgical approach of the fracture, he understands that when this heals he may still have a slight flexion deformity.  He will see hand therapy tomorrow for formation of 2 custom mallet finger splints that he will wear for the next 5 weeks.  He understands that when he changes the splint he will have to keep his finger in extension by either pinching it with his thumb or keeping it flat on the table.  Follow-up with me in 5 weeks.

## 2023-03-26 NOTE — PROGRESS NOTES
Hand Therapy Initial Evaluation    Current Date:  3/27/2023    Diagnosis: Closed avulsion fx of distal phalanx of finger, R IF   DOI: 3/25/23 (script date); referred by Jayjay Welch; injured while playing basketball ~2 weeks ago     Per recent MD visit, pt to begin hand therapy for formation of 2 custom  Mallet finger splints that he will wear for the next 5 weeks.     Subjective:  Edy Calderon is a 32 year old male.    Patient reports symptoms of the right IF pain which occurred while playing basketball ~2 weeks ago. Since onset symptoms are Unchanged  General health as reported by patient is good.     Answers for HPI/ROS submitted by the patient on 3/27/2023  Reason for Visit:: finger fracture  When problem began:: 3/3/2023  How problem occurred:: basketball  Number scale: 7/10  General health as reported by patient: good  Please check all that apply to your current or past medical history: none  Medical allergies: none  Surgeries: none  Medications you are currently taking: none  What are your primary job tasks: other  Other Tasks Detail:     Occupational Profile Information:  Right hand dominant  Prior functional level:  no limitations  Patient reports symptoms of pain and stiffness/loss of motion  Special tests:  x-ray (+acute mildly displaced avulsion fx at the dorsal base of the second distal phalanx)  Previous treatment: prefab stax splint   Barriers include:none  Mobility: No difficulty  Transportation: drives  Currently working in normal job without restrictions    Functional Outcome Measure:   Upper Extremity Functional Index Score:  SCORE:   Column Totals: /80: (P) 49   (A lower score indicates greater disability.)    Objective:  Pain Level (Scale 0-10):   3/27/2023   At Rest 7/10    With Use Contraindicated      Pain Description:  Date 3/27/2023   Location R index finger (dorsal aspect of distal phalanx)    Pain Quality Aching, very 'tender'   Frequency constant     Pain is worst   daytime or nighttime    Exacerbated by  none, pain constant at rest    Relieved by Splint    Progression Unchanged      Edema  Mild, Moderate edema noted on the dorsal aspect of R IF distal phalanx        Sensation  WFL sensation in R IF per patient     ROM  Index Finger 3/27/2023 3/27/2023   AROM (PROM) L  R    MCP / /   PIP / +3/   DIP / -10/   INIGUEZ     *-8 degree of radial deviation of DIPj - see photo;    Strength  Contraindicated     Assessment:  Patient presents with symptoms consistent with diagnosis of right index finger mallet,  with non-surgical intervention.     Patient's limitations or Problem List includes:  Pain, Decreased ROM/motion and Increased edema of the right index finger which interferes with the patient's ability to perform Self Care Tasks (dressing, bathing), Work Tasks, Recreational Activities and Household Chores as compared to previous level of function.    Rehab Potential:  Excellent - Return to full activity, no limitations    Patient will benefit from skilled Occupational Therapy to increase ROM and flexibility and decrease pain and edema to return to previous activity level and resume normal daily tasks and to reach their rehab potential.    Barriers to Learning:  No barrier    Communication Issues:  Patient appears to be able to clearly communicate and understand verbal and written communication and follow directions correctly.    Chart Review: Brief history including review of medical and/or therapy records relating to the presenting problem    Identified Performance Deficits: bathing/showering, dressing, functional mobility, home establishment and management, meal preparation and cleanup, shopping, work and leisure activities    Assessment of Occupational Performance:  5 or more Performance Deficits    Clinical Decision Making (Complexity): Low complexity    Treatment Explanation:  The following has been discussed with the patient:  RX ordered/plan of care  Anticipated  outcomes  Possible risks and side effects    Plan:  Frequency:  1 X week, once daily  Duration:  for 8 weeks    Treatment Plan:   Modalities:  Paraffin  Therapeutic Exercise:  AROM, AAROM, PROM, Tendon Gliding, Blocking, Reverse Blocking, Place and Hold, Contract Relax, Extensor Tracking, Isotonics and Isometrics  Neuromuscular re-education:  Isometrics and Stabilization  Manual Techniques:  Friction massage, Myofascial release and Manual edema mobilization  Orthotic Fabrication:  Static and Finger based  Self Care:  Self Care Tasks, Ergonomic Considerations and Work Tasks  Discharge Plan:  Achieve all LTG.  Independent in home treatment program.  Reach maximal therapeutic benefit.    Home Exercise Program:  Mallet Finger orthosis (PIP included), full time, only removed for hygiene and tip kept straight during that time    Next Visit:  Check splint fit   Reassess edema

## 2023-03-27 ENCOUNTER — THERAPY VISIT (OUTPATIENT)
Dept: OCCUPATIONAL THERAPY | Facility: CLINIC | Age: 33
End: 2023-03-27
Payer: COMMERCIAL

## 2023-03-27 ENCOUNTER — TELEPHONE (OUTPATIENT)
Dept: ORTHOPEDICS | Facility: CLINIC | Age: 33
End: 2023-03-27

## 2023-03-27 DIAGNOSIS — S62.639A CLOSED AVULSION FRACTURE OF DISTAL PHALANX OF FINGER, INITIAL ENCOUNTER: ICD-10-CM

## 2023-03-27 DIAGNOSIS — M25.441 FINGER JOINT SWELLING, RIGHT: ICD-10-CM

## 2023-03-27 DIAGNOSIS — M79.644 PAIN OF FINGER OF RIGHT HAND: ICD-10-CM

## 2023-03-27 PROCEDURE — 97165 OT EVAL LOW COMPLEX 30 MIN: CPT | Mod: GO

## 2023-03-27 PROCEDURE — 97760 ORTHOTIC MGMT&TRAING 1ST ENC: CPT | Mod: GO

## 2023-03-27 NOTE — TELEPHONE ENCOUNTER
Discussed with patient at [phone] that I had his finger fracture x-ray over read by Dr. Swartz in hand surgery in [facility] and hand surgery is not necessary.  He will continue with the custom splint from hand therapy and follow-up with me.  pm

## 2023-03-31 NOTE — PROGRESS NOTES
---  Treat   - recheck splint (DIP in hyperext)    - correct deviation, if possible   - schedule follow-up with Dr. Davalos ( 5 weeks from 3/25/23)

## 2023-04-03 ENCOUNTER — THERAPY VISIT (OUTPATIENT)
Dept: OCCUPATIONAL THERAPY | Facility: CLINIC | Age: 33
End: 2023-04-03
Payer: COMMERCIAL

## 2023-04-03 DIAGNOSIS — M25.441 FINGER JOINT SWELLING, RIGHT: ICD-10-CM

## 2023-04-03 DIAGNOSIS — S62.639A CLOSED AVULSION FRACTURE OF DISTAL PHALANX OF FINGER, INITIAL ENCOUNTER: Primary | ICD-10-CM

## 2023-04-03 DIAGNOSIS — M79.644 PAIN OF FINGER OF RIGHT HAND: ICD-10-CM

## 2023-04-03 PROCEDURE — 97763 ORTHC/PROSTC MGMT SBSQ ENC: CPT | Mod: GO

## 2023-04-10 ENCOUNTER — THERAPY VISIT (OUTPATIENT)
Dept: OCCUPATIONAL THERAPY | Facility: CLINIC | Age: 33
End: 2023-04-10
Payer: COMMERCIAL

## 2023-04-10 DIAGNOSIS — M79.644 PAIN OF FINGER OF RIGHT HAND: ICD-10-CM

## 2023-04-10 DIAGNOSIS — S62.639A CLOSED AVULSION FRACTURE OF DISTAL PHALANX OF FINGER, INITIAL ENCOUNTER: Primary | ICD-10-CM

## 2023-04-10 DIAGNOSIS — M25.441 FINGER JOINT SWELLING, RIGHT: ICD-10-CM

## 2023-04-10 PROCEDURE — 97763 ORTHC/PROSTC MGMT SBSQ ENC: CPT | Mod: GO | Performed by: OCCUPATIONAL THERAPIST

## 2024-01-13 ENCOUNTER — HOSPITAL ENCOUNTER (EMERGENCY)
Facility: HOSPITAL | Age: 34
Discharge: HOME | End: 2024-01-14
Attending: STUDENT IN AN ORGANIZED HEALTH CARE EDUCATION/TRAINING PROGRAM | Admitting: STUDENT IN AN ORGANIZED HEALTH CARE EDUCATION/TRAINING PROGRAM
Payer: COMMERCIAL

## 2024-01-13 DIAGNOSIS — F10.129 ALCOHOL ABUSE WITH INTOXICATION (H): ICD-10-CM

## 2024-01-13 PROCEDURE — 99285 EMERGENCY DEPT VISIT HI MDM: CPT | Mod: 25

## 2024-01-14 VITALS
OXYGEN SATURATION: 97 % | HEIGHT: 65 IN | TEMPERATURE: 97.6 F | WEIGHT: 150 LBS | HEART RATE: 67 BPM | DIASTOLIC BLOOD PRESSURE: 61 MMHG | SYSTOLIC BLOOD PRESSURE: 100 MMHG | RESPIRATION RATE: 18 BRPM | BODY MASS INDEX: 24.99 KG/M2

## 2024-01-14 PROCEDURE — 250N000011 HC RX IP 250 OP 636: Performed by: STUDENT IN AN ORGANIZED HEALTH CARE EDUCATION/TRAINING PROGRAM

## 2024-01-14 PROCEDURE — 96372 THER/PROPH/DIAG INJ SC/IM: CPT | Performed by: STUDENT IN AN ORGANIZED HEALTH CARE EDUCATION/TRAINING PROGRAM

## 2024-01-14 RX ORDER — LORAZEPAM 2 MG/ML
2 INJECTION INTRAMUSCULAR ONCE
Status: COMPLETED | OUTPATIENT
Start: 2024-01-14 | End: 2024-01-14

## 2024-01-14 RX ORDER — LORAZEPAM 2 MG/ML
2 INJECTION INTRAMUSCULAR ONCE
Status: DISCONTINUED | OUTPATIENT
Start: 2024-01-14 | End: 2024-01-14

## 2024-01-14 RX ORDER — LORAZEPAM 0.5 MG/1
1 TABLET ORAL ONCE
Status: DISCONTINUED | OUTPATIENT
Start: 2024-01-14 | End: 2024-01-14

## 2024-01-14 RX ADMIN — LORAZEPAM 2 MG: 2 INJECTION INTRAMUSCULAR; INTRAVENOUS at 00:18

## 2024-01-14 ASSESSMENT — ACTIVITIES OF DAILY LIVING (ADL)
ADLS_ACUITY_SCORE: 35

## 2024-01-14 NOTE — ED NOTES
Called Angelique simmons, Mom's # # [phone] and still not in service.  Pt brought to waiting room.  Security Phillip made aware about pt good to be discharged and pt looking for a ride

## 2024-01-14 NOTE — ED NOTES
Patient refused to take PO meds to help him sleep and calm down. Patient doesn't believe he is altered and has become belligerent. RN explained the consequences of not taking the PO meds.

## 2024-01-14 NOTE — ED NOTES
Bria SCHWARTZ called for the second time. Patient was not cooperative at this time. De-escalation was not successful. Patient will need to be restrained for his own safety and for the safety of the staff.

## 2024-01-14 NOTE — ED NOTES
Patient took off vital monitoring devices and attempted to leave.  MD called to bedside to help deescalate patient and explain the safety reasons as to why its not safe for him to leave at this time.
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
## 2024-01-14 NOTE — DISCHARGE INSTRUCTIONS
Clean wounds with antibacterial soap/water.  Follow up with primary clinic and chemical dependency resources below.    Chemical Dependency Treatment     [facility]    [phone] or [phone]  [address] [place] [address]   Services include screening assessments, medicallysupervised detoxification, inpatient and outpatient evaluation and referral, combined inpatient to outpatient treatment sequences, family counseling and aftercare.      [place] [facility]   www.[place].org  [phone] [address] [facility]   Serves adults and teens (minimum age is 16); has short-term treatment and a long-term recovery program; uses 12-step, cognitive behavioral therapy, motivational enhancement; faithbased, and recoverymanagement; high school on-site and Revelation programming available      [facility]   [email or web address]  [phone]  [facility]  [address] [facility]     [facility]   www.[employer]  [phone]  [address] [place] [address], [facility] [address] [place]     [facility] (Inpatient & Outpatient)  [email or web address]  [phone]  Phone consultation available 24/7  In-person Assessments  [address] [place] [address], [address], [place] [address]  [address] [address] [place], [place] [id number] [place] [address] [address] [place] [address] [address] [place] [address] (Inpatient & Outpatient)    [place], [place]  Contact  for Chemical Health Evaluation, [phone]  Funded by: Rule 25 in [facility]. MedicalAssistance (MA) providers of [facility], [employer], Blue Cross, [employer], [employer], Medicare A&B. Private insurance reviewed case by case.     [facility]-Based Inpatient & Outpatient)  [email or web address]  [phone]  [address]  [facility]  http://www.[facility]//specialties/mental-health/adap.html  [phone]  Alcohol [facility] Drug Abuse Program - [place]  [address] [facility], [address]  [place] [address]  Assessments are available during the day and on a walk-in basis Monday-Friday starting at 8am.     [facility]  [phone]    [address] [place] [address]     [facility] (Residential & Outpatient)  [email or web address]  Women s Programs: [phone], [address], [place] [address]     [facility] (Does Rule 25 Assessments)  [email or web address]  [phone]  [address], [address] [address]     [place]  http://[email or web address]/  [phone]  [address] [place] [address]  [address] [place] [address]  Rule 25 Assessments, Substance Abuse Assessments, Men s & Women s Programs     [facility]  https://www.[facility] Center[facility].com/our-services/drug-alcohol-treatment  [phone], [address], [place], [place] [address] [phone], [address], [address], [place], [place] [address] [phone], [address] [facility], [address], [place], [place] [address] [phone], [address], ([facility]), [place], [place] [id number] [phone], [address] [address] [place] [address]     NationalInstitute on Drug Abuse

## 2024-01-14 NOTE — ED NOTES
Patient sleeping soundly at this time with O2. Restraints removed. Vitals will continue to be monitored.

## 2024-01-14 NOTE — ED NOTES
"ED SIGNOUT  Date/Time:1/14/2024 7:46 AM    Patient signed out to me by my colleague, Dr. Gamez.  Please see their note for complete history and physical. Plan to follow up on clinical sobriety. Pt arrived agitated, intoxicated, received ativan and briefly restrained.  Pt now calm, sleeping, out of restraints.  Plan for discharge when clinically sober.    The creation of this record is based on the scribe s observations of the work being performed by Conchita Verma and the provider s statements to them. It was created on their behalf by Conchita Verma a trained medical scribe. This document has been checked and approved by the attending provider.      REMAINING ED WORKUP:  None, awaiting clinical sobriety for discharge    Vitals:  /61   Pulse 67   Temp 97.6  F (36.4  C)   Resp 18   Ht 1.651 m (5' 5\")   Wt 68 kg (150 lb)   SpO2 97%   BMI 24.96 kg/m        Pertinent labs results reviewed        Pertinent imaging reviewed   Please see official radiology report.  No orders to display        Interventions  Medications   LORazepam (ATIVAN) injection 2 mg (2 mg Intramuscular $Given 1/14/24 0018)        ED Course/MDM:  7:36 AM Signout accepted from Dr. Gamez.  Prior records were reviewed.  Diagnostics from this visit are reviewed.  9:00 AM I introduced myself to the patient.  Able to awaken him and speak with him. No focal deficits or new trauma found.  Pt instructed we need him to wake up and move around. Appears clinically sober at this time and will road test with plan for discharge.  9:41 AM Nursing kindly cleaned/dressed wounds.  Nothing that requires primary repair.    10:24 AM Pt's sister is coming to get him.  Pt is clinically sober at this time.  They are conversing normally with staff, ambulating independently in the ED and tolerating PO well.  They do not appear to be in any immediate danger to themselves and are appropriate for discharge at this time.             1. Alcohol abuse with intoxication (H24)  "         Juan Hobbs DO  [facility] Emergency Department       Juan Hobbs MD  01/14/24 2670

## 2024-01-14 NOTE — ED NOTES
"Attempted to call pt's contact number for Mom  4 x and number is not in service.  Dr. Hobbs was informed  and ok to be discharged in the waiting room to figure out his ride.  This RN tried to check with pt if he can give a different contact number for a ride and said he doesn't have any.  This Rn check if pt has a phone and pt responded \"  took it\".  "

## 2024-01-14 NOTE — ED NOTES
Pt awake, was helped into a chair and wounds on feet and knees were cleansed. Pt declined dressings. Pt given a shirt, socks and shoes to wear home.

## 2024-01-14 NOTE — ED NOTES
Triage went successfully, patient cooperated. RN left the door closed but curtains open to keep an eye on the patient. However, patient is argumentative.

## 2024-01-14 NOTE — ED PROVIDER NOTES
EMERGENCY DEPARTMENT ENCOUNTER      NAME: Edy Calderon  AGE: 33 year old male  YOB: 1990  MRN: [id number]  EVALUATION DATE & TIME: 1/13/2024 11:21 PM    PCP: No Ref-Primary, Physician    ED PROVIDER: Aleksandar Gamez MD      No chief complaint on file.        FINAL IMPRESSION:  No diagnosis found.      ED COURSE & MEDICAL DECISION MAKING:    Pertinent Labs & Imaging studies reviewed. (See chart for details)  33 year old male presents to the Emergency Department for evaluation of ethanol intoxication.  Does have some dried blood on the feet and hands from running out of the snow but denies any specific injuries or trauma.  No specific medical complaints on initial evaluation.    Here patient is awake and answers questions appropriately but does require some redirection.  He appears clinically intoxicated his presentation is consistent with ethanol intoxication.  Some dried blood on the bilateral hands but no active bleeding.  Small abrasion to the dorsum of the left great toe is hemostatic.  Heart is regular in rate and rhythm.  No evidence of trauma to the chest or abdominal wall.  No signs of trauma to the head.    Patient denies any specific medical complaints at this point in time.  His presentation is consistent with alcohol intoxication he endorses drinking alcohol earlier today.  Do not believe he requires additional laboratory workup at this point in time.   will observe until clinically sober.    ED Course as of 01/14/24 0729   Sun Jan 14, 2024   0013 Patient verbally aggressive and abusive to staff.  Repeatedly trying to get out of bed and leave the emergency department.  Repetitive attempts at verbal deescalation failing and patient remains aggressive.  Does not have capacity at this point in time given his clinical intoxication.  IM Ativan for agitation and aggressive behavior and he was also physically restrained given concern of being a risk of harm to himself or others.   0722 Upon  reevaluation of the my shift patient still somewhat somnolent and required a bit more time of observation until clinically sober.  Once clinically sober present patient will be safe for discharge home.  Patient signed out to a colleague who will update documentation and treatment plan as needed.       Medical Decision Making    History:  Supplemental history from: Documented in chart and EMS  External Record(s) reviewed: Documented in chart    Work Up:  Chart documentation includes differential considered and any EKGs or imaging independently interpreted by provider, where specified.  In additional to work up documented, I considered the following work up: Documented in chart, if applicable.    External consultation:  Discussion of management with another provider: Documented in chart, if applicable    Complicating factors:  Care impacted by chronic illness: N/A  Care affected by social determinants of health: Alcohol Abuse and/or Recreational Drug Use in Family    Disposition considerations: Admission considered. Patient was signed out to the oncoming physician, disposition pending.       At the conclusion of the encounter I discussed the results of all of the tests and the disposition. The questions were answered. The patient or family acknowledged understanding and was agreeable with the care plan.     0 minutes of critical care time     MEDICATIONS GIVEN IN THE EMERGENCY:  Medications - No data to display    NEW PRESCRIPTIONS STARTED AT TODAY'S ER VISIT  New Prescriptions    No medications on file          =================================================================    HPI    Patient information was obtained from: Patient and paramedics          Edy Calderon is a 33 year old male presenting to the emergency department for alcohol intoxication.  Patient was reportedly running around outside wearing only jeans without a shirt or coat in the cold.  Went to the [employer] that was next-door to the party he  was going to reportedly was causing a disturbance.  Was argumentative for police on scene was initially restrained by them before evaluation by paramedics.  En route to the hospital patient was redirectable and did not receive any sedating medication.  Paramedics noted some dried blood in his hands and feet but no active bleeding.  Blood glucose 150.  On presentation here patient endorses drinking numerous alcoholic drinks tonight.  Denies any other drug use.  Denies significant past medical history.  No specific point this point in time.    REVIEW OF SYSTEMS   Refer to the [facility]    PAST MEDICAL HISTORY:  No past medical history on file.    PAST SURGICAL HISTORY:  No past surgical history on file.        CURRENT MEDICATIONS:    diclofenac (VOLTAREN) 75 MG EC tablet  methocarbamol (ROBAXIN) 750 MG tablet        ALLERGIES:  No Known Allergies    FAMILY HISTORY:  No family history on file.    SOCIAL HISTORY:   Social History     Socioeconomic History    Marital status: Single   Tobacco Use    Smoking status: Never    Smokeless tobacco: Never   Substance and Sexual Activity    Alcohol use: Not Currently    Drug use: Never       VITALS:  There were no vitals taken for this visit.    PHYSICAL EXAM    Constitutional: Well developed, Well nourished, NAD,  HENT: Normocephalic, Atraumatic,  mucous membranes moist,   Neck- trachea midline, No stridor.    Eyes:EOMI, Conjunctiva normal, No discharge.   Respiratory: Normal breath sounds, No respiratory distress, No wheezing.    Cardiovascular: Normal heart rate, Regular rhythm, No murmurs,   Abdominal: Soft, No tenderness, No rebound or guarding.     Musculoskeletal: no deformity or malalignment   Integument: Warm, dry blood bilateral dorsum of the feet with a small abrasion to the dorsum of the left toe, no active bleeding  Neurologic: Alert & oriented x 3, slightly slurred speech, spontaneous movement in upper and lower extremities, walking around the emergency department room  with slightly unsteady gait.  Psychiatric: Guarded, agitated     LAB:  All pertinent labs reviewed and interpreted.       RADIOLOGY:  Reviewed all pertinent imaging. Please see official radiology report.  No orders to display       EKG:        PROCEDURES:         [employer] [place] [facility] Documentation:   CMS Diagnoses:                  Aleksandar Gamez MD  [facility] EMERGENCY DEPARTMENT  [address] [place] [id number]  [phone]      Aleksandar Gamez MD  01/14/24 0770